# Patient Record
Sex: FEMALE | Race: ASIAN | NOT HISPANIC OR LATINO | URBAN - METROPOLITAN AREA
[De-identification: names, ages, dates, MRNs, and addresses within clinical notes are randomized per-mention and may not be internally consistent; named-entity substitution may affect disease eponyms.]

---

## 2020-10-26 ENCOUNTER — OFFICE VISIT (OUTPATIENT)
Dept: URBAN - METROPOLITAN AREA CLINIC 98 | Facility: CLINIC | Age: 35
End: 2020-10-26

## 2020-10-29 ENCOUNTER — OFFICE VISIT (OUTPATIENT)
Dept: URBAN - METROPOLITAN AREA CLINIC 98 | Facility: CLINIC | Age: 35
End: 2020-10-29

## 2020-11-09 ENCOUNTER — DASHBOARD ENCOUNTERS (OUTPATIENT)
Age: 35
End: 2020-11-09

## 2020-11-11 ENCOUNTER — OFFICE VISIT (OUTPATIENT)
Dept: URBAN - METROPOLITAN AREA CLINIC 98 | Facility: CLINIC | Age: 35
End: 2020-11-11

## 2020-11-11 NOTE — HPI-TODAY'S VISIT:
Ms. Brewer is a 34 [facility] F [facility] last saw Dr. Aguila on 3- for follow up of constipation and hemorrhoids. She was increasing her water and fiber intake and though she still had some straining she felt her stools were more frequent.

## 2021-01-11 ENCOUNTER — OFFICE VISIT (OUTPATIENT)
Dept: URBAN - METROPOLITAN AREA CLINIC 98 | Facility: CLINIC | Age: 36
End: 2021-01-11

## 2021-03-29 ENCOUNTER — RX ONLY (RX ONLY)
Age: 36
End: 2021-03-29

## 2021-03-29 ENCOUNTER — RASH (OUTPATIENT)
Dept: URBAN - METROPOLITAN AREA CLINIC 32 | Facility: CLINIC | Age: 36
Setting detail: DERMATOLOGY
End: 2021-03-29

## 2021-03-29 DIAGNOSIS — D22.5 MELANOCYTIC NEVI OF TRUNK: ICD-10-CM

## 2021-03-29 DIAGNOSIS — B36.0 PITYRIASIS VERSICOLOR: ICD-10-CM

## 2021-03-29 PROCEDURE — 99204 OFFICE O/P NEW MOD 45 MIN: CPT

## 2021-03-29 RX ORDER — FLUCONAZOLE 200 MG/1
2 TABLET TABLET ORAL Q1WK
Qty: 4 | Refills: 0
Start: 2021-03-29

## 2021-03-29 RX ORDER — HYDROQUINONE 40 MG/G
1 APPLICATION CREAM TOPICAL DAILY
Qty: 28.35 | Refills: 0
Start: 2021-03-29

## 2021-03-29 RX ORDER — KETOCONAZOLE 20 MG/ML
1 APPLICATION SHAMPOO, SUSPENSION TOPICAL DAILY
Qty: 120 | Refills: 3
Start: 2021-03-29